# Patient Record
Sex: MALE | Race: BLACK OR AFRICAN AMERICAN | NOT HISPANIC OR LATINO | Employment: FULL TIME | URBAN - METROPOLITAN AREA
[De-identification: names, ages, dates, MRNs, and addresses within clinical notes are randomized per-mention and may not be internally consistent; named-entity substitution may affect disease eponyms.]

---

## 2017-01-10 ENCOUNTER — HOSPITAL ENCOUNTER (EMERGENCY)
Facility: OTHER | Age: 42
Discharge: HOME | End: 2017-01-10
Attending: EMERGENCY MEDICINE
Payer: COMMERCIAL

## 2017-01-10 ENCOUNTER — OFFICE VISIT (OUTPATIENT)
Dept: DERMATOLOGY | Facility: CLINIC | Age: 42
End: 2017-01-10
Payer: COMMERCIAL

## 2017-01-10 VITALS
BODY MASS INDEX: 25.77 KG/M2 | DIASTOLIC BLOOD PRESSURE: 92 MMHG | HEIGHT: 70 IN | HEART RATE: 83 BPM | TEMPERATURE: 98 F | OXYGEN SATURATION: 92 % | SYSTOLIC BLOOD PRESSURE: 149 MMHG | RESPIRATION RATE: 17 BRPM | WEIGHT: 180 LBS

## 2017-01-10 DIAGNOSIS — L73.9 FOLLICULITIS: ICD-10-CM

## 2017-01-10 DIAGNOSIS — R21 RASH: Primary | ICD-10-CM

## 2017-01-10 DIAGNOSIS — R21 RASH AND NONSPECIFIC SKIN ERUPTION: ICD-10-CM

## 2017-01-10 DIAGNOSIS — L81.9 DYSPIGMENTATION: Primary | ICD-10-CM

## 2017-01-10 PROCEDURE — 99999 PR PBB SHADOW E&M-EST. PATIENT-LVL II: CPT | Mod: PBBFAC,,, | Performed by: DERMATOLOGY

## 2017-01-10 PROCEDURE — 99283 EMERGENCY DEPT VISIT LOW MDM: CPT

## 2017-01-10 PROCEDURE — 1159F MED LIST DOCD IN RCRD: CPT | Mod: S$GLB,,, | Performed by: DERMATOLOGY

## 2017-01-10 PROCEDURE — 99203 OFFICE O/P NEW LOW 30 MIN: CPT | Mod: S$GLB,,, | Performed by: DERMATOLOGY

## 2017-01-10 PROCEDURE — 99282 EMERGENCY DEPT VISIT SF MDM: CPT

## 2017-01-10 RX ORDER — DOXYCYCLINE 100 MG/1
100 CAPSULE ORAL 2 TIMES DAILY
Qty: 28 CAPSULE | Refills: 0 | Status: SHIPPED | OUTPATIENT
Start: 2017-01-10 | End: 2017-01-24

## 2017-01-10 RX ORDER — PERMETHRIN 50 MG/G
CREAM TOPICAL ONCE
Qty: 60 G | Refills: 0 | Status: SHIPPED | OUTPATIENT
Start: 2017-01-10 | End: 2017-01-10

## 2017-01-10 RX ORDER — CLINDAMYCIN PHOSPHATE 10 MG/G
GEL TOPICAL 2 TIMES DAILY
Qty: 60 G | Refills: 1 | Status: SHIPPED | OUTPATIENT
Start: 2017-01-10 | End: 2018-01-10

## 2017-01-10 RX ORDER — HYDROQUINONE 40 MG/G
CREAM TOPICAL 2 TIMES DAILY
Qty: 46 G | Refills: 1 | Status: SHIPPED | OUTPATIENT
Start: 2017-01-10 | End: 2017-02-09

## 2017-01-10 NOTE — PROGRESS NOTES
Subjective:       Patient ID:  Meghna Viera is a 41 y.o. male who presents for   Chief Complaint   Patient presents with    Rash     back, x 1 wk, itchy at times, no tx     Rash  - Initial  Affected locations: back  Duration: 1 week (at times)  Signs / symptoms: itching  Aggravated by: nothing  Relieving factors/Treatments tried: nothing    He thinks the rash is due to bugs and he took pictures of the suspected culprits.  Pt was seen in ER and was sent to Derm clinic today  Denies personal or family h/o skin cancer    History reviewed. No pertinent past medical history.    Review of Systems   Constitutional: Positive for fatigue. Negative for fever, chills and malaise.   Skin: Positive for rash and activity-related sunscreen use. Negative for daily sunscreen use and recent sunburn.   Hematologic/Lymphatic: Does not bruise/bleed easily.        Objective:    Physical Exam   Constitutional: He appears well-developed and well-nourished. No distress.   Neurological: He is alert and oriented to person, place, and time. He is not disoriented.   Psychiatric: He has a normal mood and affect.   Skin:   Areas Examined (abnormalities noted in diagram):   Scalp / Hair Palpated and Inspected  Head / Face Inspection Performed  Neck Inspection Performed  Chest / Axilla Inspection Performed  Abdomen Inspection Performed  Back Inspection Performed  RUE Inspected  LUE Inspection Performed  Nails and Digits Inspection Performed                   Diagram Legend     Erythematous scaling macule/papule c/w actinic keratosis       Vascular papule c/w angioma      Pigmented verrucoid papule/plaque c/w seborrheic keratosis      Yellow umbilicated papule c/w sebaceous hyperplasia      Irregularly shaped tan macule c/w lentigo     1-2 mm smooth white papules consistent with Milia      Movable subcutaneous cyst with punctum c/w epidermal inclusion cyst      Subcutaneous movable cyst c/w pilar cyst      Firm pink to brown papule c/w  dermatofibroma      Pedunculated fleshy papule(s) c/w skin tag(s)      Evenly pigmented macule c/w junctional nevus     Mildly variegated pigmented, slightly irregular-bordered macule c/w mildly atypical nevus      Flesh colored to evenly pigmented papule c/w intradermal nevus       Pink pearly papule/plaque c/w basal cell carcinoma      Erythematous hyperkeratotic cursted plaque c/w SCC      Surgical scar with no sign of skin cancer recurrence      Open and closed comedones      Inflammatory papules and pustules      Verrucoid papule consistent consistent with wart     Erythematous eczematous patches and plaques     Dystrophic onycholytic nail with subungual debris c/w onychomycosis     Umbilicated papule    Erythematous-base heme-crusted tan verrucoid plaque consistent with inflamed seborrheic keratosis     Erythematous Silvery Scaling Plaque c/w Psoriasis     See annotation      Assessment / Plan:        Dyspigmentation  -     hydroquinone 4 % Crea; Apply topically 2 (two) times daily.  Dispense: 46 g; Refill: 1    Folliculitis  -     clindamycin phosphate 1% (CLINDAGEL) 1 % gel; Apply topically 2 (two) times daily. face  Dispense: 60 g; Refill: 1  -     doxycycline (MONODOX) 100 MG capsule; Take 1 capsule (100 mg total) by mouth 2 (two) times daily.  Dispense: 28 capsule; Refill: 0    Rash and nonspecific skin eruption-other than folliculitis, no active rash/lesions on exam today  Mineral oil prep negative  Will prescribe elimite empirically   -     permethrin (ELIMITE) 5 % cream; Apply topically once. Apply from neck to toes, leave on overnight, shower in the morning.  Repeat in 1 weeks  Dispense: 60 g; Refill: 0           Return in about 4 weeks (around 2/7/2017).

## 2017-01-10 NOTE — ED PROVIDER NOTES
Encounter Date: 1/10/2017       History     Chief Complaint   Patient presents with    Rash     pt states he has a bite of some kind to left side of neck and face and back. states he thinks it was spider and parasites. pt seen in [place] ED for same thing.      Review of patient's allergies indicates:   Allergen Reactions    Pcn [penicillins]      HPI Comments: Pt is a 41-year-old male with no significant medical history who presents to the emergency department with rash.  Patient states over the last several weeks he has noticed multiple bumps to his body.  Patient states he feels as if he has bugs crawling on him.  Patient states he is pulling black specks off of his skin, and he is certain that these are parasites.  Patient states he is seen many emergency department physicians who have all pushed him to the side.  Patient states he traveled from [place] today to see if we could help him.  Patient denies any fevers or chills.  Patient denies any recent travel.  Patient denies any recent camping.  Patient denies any recent change in medications, lotions, soaps, detergents, or other body products.  Patient states he knows that everyone thinks he is crazy, but he is certain that he has bugs.    The history is provided by the patient.     History reviewed. No pertinent past medical history.  No past medical history pertinent negatives.  Past Surgical History   Procedure Laterality Date    Toe surgery  2007     History reviewed. No pertinent family history.  Social History   Substance Use Topics    Smoking status: Never Smoker    Smokeless tobacco: None    Alcohol use No     Review of Systems   Constitutional: Negative for activity change, appetite change, chills, fatigue and fever.   HENT: Negative for congestion, ear discharge, ear pain, hearing loss, mouth sores, postnasal drip, rhinorrhea, sinus pressure, sore throat and trouble swallowing.    Eyes: Negative for photophobia and visual disturbance.    Respiratory: Negative for cough and shortness of breath.    Cardiovascular: Negative for chest pain.   Gastrointestinal: Negative for abdominal pain, blood in stool, constipation, diarrhea, nausea and vomiting.   Genitourinary: Negative for dysuria, flank pain and hematuria.   Musculoskeletal: Negative for back pain, neck pain and neck stiffness.   Skin: Positive for rash. Negative for wound.   Neurological: Negative for dizziness, syncope, speech difficulty, weakness, light-headedness, numbness and headaches.       Physical Exam   Initial Vitals   BP Pulse Resp Temp SpO2   01/10/17 1101 01/10/17 1101 01/10/17 1101 01/10/17 1101 01/10/17 1101   136/94 92 17 98 °F (36.7 °C) 99 %     Physical Exam    Nursing note and vitals reviewed.  Constitutional: He appears well-developed and well-nourished. He is not diaphoretic.  Non-toxic appearance. No distress.   HENT:   Head: Normocephalic.   Right Ear: Hearing and external ear normal.   Left Ear: Hearing and external ear normal.   Nose: Nose normal.   Mouth/Throat: Uvula is midline and oropharynx is clear and moist. No trismus in the jaw. No uvula swelling. No oropharyngeal exudate.   Eyes: Conjunctivae are normal. Pupils are equal, round, and reactive to light.   Neck: Normal range of motion.   Cardiovascular: Normal rate and regular rhythm.   Pulmonary/Chest: Breath sounds normal. No respiratory distress. He has no wheezes. He has no rhonchi. He has no rales. He exhibits no tenderness.   Abdominal: Soft. Bowel sounds are normal. He exhibits no distension and no mass. There is no tenderness. There is no rebound and no guarding.   Lymphadenopathy:     He has no cervical adenopathy.   Neurological: He is alert and oriented to person, place, and time.   Skin: Skin is warm and dry.   Diffuse erythematous, scabbed lesions to trunk and right extremity   Psychiatric: He has a normal mood and affect.         ED Course   Procedures  Labs Reviewed - No data to display           Medical Decision Making:   Initial Assessment:   Urgent evaluation of a 41-year-old male with no significant medical history who presents to the emergency department with rash.  Patient is afebrile, nontoxic appearing, and hemodynamically stable.  Patient has diffuse erythematous scabbed lesions to trunk.  The rash appears to be folliculitis or scabs caused from excoriation.  Patient is very adamant and wants further testing because he believes he has parasites/bugs crawling in his skin.  She reports to the waiting room and comes back to show me a bugs that he removed from his body while sitting in the waiting room.  He presented a minute black spec that I am unable to determine what it is.  Pt is very upset and has traveled far to come to our emergency department.  I do not have the proper equipment to skin scrape or perform microscopy.  Derm is consulted.  I have made an appointment for him today with Dr. Guadalupe at 2:15.  Patient is given instructions to report there immediately after being discharged.  Other:   I have discussed this case with another health care provider.       <> Summary of the Discussion: Dr. Nolan                   ED Course     Clinical Impression:   The encounter diagnosis was Rash.          Erika Carlos PA-C  01/10/17 0785

## 2017-01-10 NOTE — DISCHARGE INSTRUCTIONS
Self-Care for Skin Rashes  When your skin reacts to a substance your body is sensitive to, it can cause a rash. You can treat most rashes at home by keeping the skin clean and dry. Many rashes may get better on their own within 2 to 3 days. You may need medical attention if your rash itches, drains, or hurts, particularly if the rash is getting worse.  What can cause a skin rash?  · Sun poisoning, caused by too much exposure to the sun  · An irritant or allergic reaction to a certain type of food, plant, or chemical, such as  shellfish, poison ivy, and or cleaning products  · An infection caused by a fungus (ringworm), virus (chickenpox), or bacteria (strep)  · Bites or infestation caused by insects or pests, such as ticks, lice, or mites  · Dry skin, which is often seen during the winter months and in older people  How can I control itching and skin damage?  · Take soothing  lukewarm baths in a colloidal oatmeal product. You can buy this at the [employer].  · Do your best not to scratch. Clip fingernails short, especially in young children, to reduce skin damage if scratching does occur.  · Use moisturizing skin lotion instead of scratching your dry skin.  · Use sunscreen whenever going out into direct sun.  · Use only mild cleansing agents whenever possible.  · Wash with mild, nonirritating soap and warm water.  · Wear clothing that breathes, such as cotton shirts or canvas shoes.  · If fluid is seeping from the rash, cover it loosely with clean gauze to absorb the discharge.  · Many rashes are contagious. Prevent the rash from spreading to others by washing your hands often before or after touching others with any skin rash.  Use medicine  · Antihistamines such as diphenhydramine can help control itching. But use with caution because they can make you drowsy.  · Using over-the-counter hydrocortisone cream on small rashes may help reduce swelling and itching  · Most over-the-counter antifungal medicines can treat  athletes foot and many other fungal infections of the skin.  Check with your healthcare provider  Call your healthcare provider if:  · You were told that you have a fungal infection on your skin to make sure you have the correct type of medicine  · You have questions or concerns about medicines or their side effects.      Call 911  Call 911 if either of these occur:  · Your tongue or lips start to swell  · You have difficulty breathing      Call your healthcare provider  Call your healthcare provider if any of these occur:  · Temperature  of more than 101.0°F (38.3°C), or as directed  · Sore throat, a cough, or unusual fatigue  · Red, oozy, or painful rash gets worse. These are signs of infection.  · Rash covers your face, genitals, or most of your body  · Crusty sores or red rings that begin to spread  · You were exposed to someone who has a contagious rash, such as scabies or lice.  · Red bulls-eye rash with a white center (a sign of Lyme disease)  · You were told that you have resistant bacteria (MRSA) on your skin.   © 1217-8097 [employer]. [address] [place] [address], [place] [address]. All rights reserved. This information is not intended as a substitute for professional medical care. Always follow your healthcare professional's instructions.

## 2017-01-10 NOTE — ED AVS SNAPSHOT
"          [facility]  [address]  [facility] [address]               Meghna Viera   1/10/2017 12:18 PM   ED    Description:  Male : 1975   Department:  [facility]           Your Care was Coordinated By:     Provider Role From To    Ashley Nolan MD Attending Provider 01/10/17 1225 --    Erika Carlos PA-C Physician Assistant 01/10/17 1223 --      Reason for Visit     Rash           Diagnoses this Visit        Comments    Rash    -  Primary       ED Disposition     None           To Do List           Follow-up Information     Follow up with Citlaly Guadalupe MD.    Specialty:  Dermatology    Why:  On the 11th floor; take elevator C    Contact information:    [address]  [facility] [address]  [phone]        [facility]     [facility] Care [facility] -  Assistance  Registered nurses in the [facility] provide clinical advisement, health education, appointment booking, and other advisory services.  Call for this free service at [phone].             Medications           Message regarding Medications     Verify the changes and/or additions to your medication regime listed below are the same as discussed with your clinician today.  If any of these changes or additions are incorrect, please notify your healthcare provider.             Verify that the below list of medications is an accurate representation of the medications you are currently taking.  If none reported, the list may be blank. If incorrect, please contact your healthcare provider. Carry this list with you in case of emergency.                Clinical Reference Information           Your Vitals Were     BP Pulse Temp Resp Height Weight    136/94 (BP Location: Left arm, Patient Position: Sitting) 92 98 °F (36.7 °C) (Oral) 17 5' 10" (1.778 m) 81.6 kg (180 lb)    SpO2 BMI             99% 25.83 kg/m2         Allergies as of 1/10/2017        " Reactions    Pcn [Penicillins]       Immunizations Administered on Date of Encounter - 1/10/2017     None      ED Micro, Lab, POCT     None      ED Imaging Orders     None        Discharge Instructions         Self-Care for Skin Rashes  When your skin reacts to a substance your body is sensitive to, it can cause a rash. You can treat most rashes at home by keeping the skin clean and dry. Many rashes may get better on their own within 2 to 3 days. You may need medical attention if your rash itches, drains, or hurts, particularly if the rash is getting worse.  What can cause a skin rash?  · Sun poisoning, caused by too much exposure to the sun  · An irritant or allergic reaction to a certain type of food, plant, or chemical, such as  shellfish, poison ivy, and or cleaning products  · An infection caused by a fungus (ringworm), virus (chickenpox), or bacteria (strep)  · Bites or infestation caused by insects or pests, such as ticks, lice, or mites  · Dry skin, which is often seen during the winter months and in older people  How can I control itching and skin damage?  · Take soothing  lukewarm baths in a colloidal oatmeal product. You can buy this at the [employer].  · Do your best not to scratch. Clip fingernails short, especially in young children, to reduce skin damage if scratching does occur.  · Use moisturizing skin lotion instead of scratching your dry skin.  · Use sunscreen whenever going out into direct sun.  · Use only mild cleansing agents whenever possible.  · Wash with mild, nonirritating soap and warm water.  · Wear clothing that breathes, such as cotton shirts or canvas shoes.  · If fluid is seeping from the rash, cover it loosely with clean gauze to absorb the discharge.  · Many rashes are contagious. Prevent the rash from spreading to others by washing your hands often before or after touching others with any skin rash.  Use medicine  · Antihistamines such as diphenhydramine can help control itching.  But use with caution because they can make you drowsy.  · Using over-the-counter hydrocortisone cream on small rashes may help reduce swelling and itching  · Most over-the-counter antifungal medicines can treat athletes foot and many other fungal infections of the skin.  Check with your healthcare provider  Call your healthcare provider if:  · You were told that you have a fungal infection on your skin to make sure you have the correct type of medicine  · You have questions or concerns about medicines or their side effects.      Call 911  Call 911 if either of these occur:  · Your tongue or lips start to swell  · You have difficulty breathing      Call your healthcare provider  Call your healthcare provider if any of these occur:  · Temperature  of more than 101.0°F (38.3°C), or as directed  · Sore throat, a cough, or unusual fatigue  · Red, oozy, or painful rash gets worse. These are signs of infection.  · Rash covers your face, genitals, or most of your body  · Crusty sores or red rings that begin to spread  · You were exposed to someone who has a contagious rash, such as scabies or lice.  · Red bulls-eye rash with a white center (a sign of Lyme disease)  · You were told that you have resistant bacteria (MRSA) on your skin.   © 5803-1571 [employer]. [address] [place] [address], [place] [address]. All rights reserved. This information is not intended as a substitute for professional medical care. Always follow your healthcare professional's instructions.          Your Scheduled Appointments     Thiago 10, 2017  2:15 PM CST   New Patient with MD Cristóbal Mi - Dermatology ([address] )    [address]  [facility] [address]   [phone]              [facility] Sign-Up     Activating your [facility] account is as easy as 1-2-3!     1) Visit [email or web address], select Sign Up Now, enter this activation code and your date of birth, then select Next.  [email or web address]  Expires:  2/24/2017 12:53 PM      2) Create a username and password to use when you visit [facility] in the future and select a security question in case you lose your password and select Next.    3) Enter your e-mail address and click Sign Up!    Additional Information  If you have questions, please e-mail [email or web address] or call [phone] to talk to our [facility] staff. Remember, [facility] is NOT to be used for urgent needs. For medical emergencies, dial 911.          [facility] complies with applicable Federal civil rights laws and does not discriminate on the basis of race, color, national origin, age, disability, or sex.        Language Assistance Services     ATTENTION: Language assistance services are available, free of charge. Please call [phone].      ATENCIÓN: Si habla español, tiene a galvan disposición servicios gratuitos de asistencia lingüística. Llame al [phone].     SHARYN Ý: N?u b?n nói Ti?ng Vi?t, có các d?ch v? h? tr? ngôn ng? mi?n phí dành cho b?n. G?i s? [phone].

## 2017-01-10 NOTE — LETTER
January 15, 2017      Erika Carlos PA-C  [address]  [facility] [address]           [facility] - Dermatology  [address]  [facility] [address]  Phone: [phone]  Fax: [phone]          Patient: Meghna Viera   MR Number: [id number]   YOB: 1975   Date of Visit: 1/10/2017       Dear Erika Carlos:    Thank you for referring Meghna Viera to me for evaluation. Attached you will find relevant portions of my assessment and plan of care.    If you have questions, please do not hesitate to call me. I look forward to following Meghna Viera along with you.    Sincerely,    Citlaly Guadalupe MD    Enclosure  CC:  No Recipients    If you would like to receive this communication electronically, please contact [email or web address] or [phone] to request more information on [employer] access.    For providers and/or their staff who would like to refer a patient to [facility], please contact us through our one-stop-shop provider referral line, [facility], at [phone].    If you feel you have received this communication in error or would no longer like to receive these types of communications, please e-mail [email or web address]

## 2017-01-10 NOTE — ED TRIAGE NOTES
Patient c/o a generalized rash for 4-5 days that itch.  Patient thinks he was bit by some bugs.  Patient was seen at another ED and was given an antibiotic that he cant recall the name of.

## 2017-05-26 ENCOUNTER — TELEPHONE (OUTPATIENT)
Dept: DERMATOLOGY | Facility: CLINIC | Age: 42
End: 2017-05-26

## 2017-05-26 RX ORDER — PERMETHRIN 50 MG/G
CREAM TOPICAL ONCE
Qty: 60 G | Refills: 0 | OUTPATIENT
Start: 2017-05-26 | End: 2017-05-26

## 2017-05-26 NOTE — TELEPHONE ENCOUNTER
----- Message from Lsia Floyd sent at 5/25/2017  4:08 PM CDT -----  Call patient about a refill request. Call [phone]

## 2017-12-30 PROCEDURE — 99283 EMERGENCY DEPT VISIT LOW MDM: CPT | Mod: 25

## 2017-12-30 PROCEDURE — 99284 EMERGENCY DEPT VISIT MOD MDM: CPT | Mod: ,,, | Performed by: PHYSICIAN ASSISTANT

## 2017-12-31 ENCOUNTER — HOSPITAL ENCOUNTER (EMERGENCY)
Facility: HOSPITAL | Age: 42
Discharge: HOME | End: 2017-12-31
Attending: EMERGENCY MEDICINE
Payer: COMMERCIAL

## 2017-12-31 VITALS
WEIGHT: 190 LBS | TEMPERATURE: 99 F | DIASTOLIC BLOOD PRESSURE: 97 MMHG | HEIGHT: 70 IN | SYSTOLIC BLOOD PRESSURE: 134 MMHG | OXYGEN SATURATION: 98 % | RESPIRATION RATE: 18 BRPM | BODY MASS INDEX: 27.2 KG/M2 | HEART RATE: 89 BPM

## 2017-12-31 DIAGNOSIS — R21 RASH: Primary | ICD-10-CM

## 2017-12-31 PROCEDURE — 96372 THER/PROPH/DIAG INJ SC/IM: CPT

## 2017-12-31 PROCEDURE — 63600175 PHARM REV CODE 636 W HCPCS: Performed by: PHYSICIAN ASSISTANT

## 2017-12-31 RX ORDER — TRAMADOL HYDROCHLORIDE 50 MG/1
50 TABLET ORAL EVERY 8 HOURS PRN
Qty: 8 TABLET | Refills: 0 | Status: SHIPPED | OUTPATIENT
Start: 2017-12-31 | End: 2018-01-05

## 2017-12-31 RX ORDER — KETOROLAC TROMETHAMINE 30 MG/ML
10 INJECTION, SOLUTION INTRAMUSCULAR; INTRAVENOUS
Status: COMPLETED | OUTPATIENT
Start: 2017-12-31 | End: 2017-12-31

## 2017-12-31 RX ORDER — CEPHALEXIN 250 MG/1
250 CAPSULE ORAL 4 TIMES DAILY
Qty: 28 CAPSULE | Refills: 0 | Status: SHIPPED | OUTPATIENT
Start: 2017-12-31 | End: 2018-01-07

## 2017-12-31 RX ORDER — PREDNISONE 20 MG/1
20 TABLET ORAL DAILY
Qty: 5 TABLET | Refills: 0 | Status: SHIPPED | OUTPATIENT
Start: 2017-12-31 | End: 2018-01-05

## 2017-12-31 RX ORDER — PREDNISONE 20 MG/1
40 TABLET ORAL
Status: COMPLETED | OUTPATIENT
Start: 2017-12-31 | End: 2017-12-31

## 2017-12-31 RX ORDER — PREDNISONE 20 MG/1
20 TABLET ORAL DAILY
Qty: 5 TABLET | Refills: 0 | Status: SHIPPED | OUTPATIENT
Start: 2017-12-31 | End: 2017-12-31

## 2017-12-31 RX ADMIN — PREDNISONE 40 MG: 20 TABLET ORAL at 01:12

## 2017-12-31 RX ADMIN — KETOROLAC TROMETHAMINE 10 MG: 30 INJECTION, SOLUTION INTRAMUSCULAR at 01:12

## 2017-12-31 NOTE — ED TRIAGE NOTES
Patient presents to the ED c/o fever, body aches and skin irritation x 1 week. Patient took Tylenol at approx.2100. Patient denies any nausea, vomting or diarrhea.

## 2017-12-31 NOTE — ED PROVIDER NOTES
Encounter Date: 12/30/2017       History     Chief Complaint   Patient presents with    Generalized Body Aches     pt reports body aches and states that he noticed dark color over buttocks, pt reports fever at home, afebrile in traige      Patient is a 42-year-old male with no significant past medical history who presents to the emergency department due to a 1 week history of rash.  Patient states he noted a rash on the back side of his legs extending from his buttocks to his ankle.  Patient states the rash is very sensitive to the touch and painful.  Patient does also report intermittent fevers throughout the week of 102-103.  Patient states he has been taking Tylenol and ibuprofen with some relief of the fever.  Patient states that he tried to use Vaseline on these lesions with no improvement. He has had some cough and congestion this week. Patient denies any nausea, vomiting, chills, chest pain, shortness breath, or any other complaints.          Review of patient's allergies indicates:   Allergen Reactions    Pcn [penicillins]      History reviewed. No pertinent past medical history.  Past Surgical History:   Procedure Laterality Date    TOE SURGERY  2007     History reviewed. No pertinent family history.  Social History   Substance Use Topics    Smoking status: Never Smoker    Smokeless tobacco: Never Used    Alcohol use No     Review of Systems   Constitutional: Positive for fever. Negative for activity change, appetite change, diaphoresis and fatigue.   HENT: Negative for congestion, dental problem, drooling, ear pain, facial swelling, sore throat and trouble swallowing.    Eyes: Negative for pain, discharge and visual disturbance.   Respiratory: Negative for apnea, cough, chest tightness and shortness of breath.    Cardiovascular: Negative for chest pain and palpitations.   Gastrointestinal: Negative for abdominal distention, anal bleeding, blood in stool, diarrhea, nausea and vomiting.   Endocrine:  Negative for cold intolerance and polydipsia.   Genitourinary: Negative for decreased urine volume, difficulty urinating, enuresis, frequency and hematuria.   Musculoskeletal: Negative for arthralgias, gait problem, myalgias and neck stiffness.   Skin: Negative for color change and pallor.   Allergic/Immunologic: Negative for environmental allergies.   Neurological: Negative for dizziness, syncope, numbness and headaches.   Psychiatric/Behavioral: Negative for agitation, confusion and dysphoric mood.       Physical Exam     Initial Vitals [12/30/17 2306]   BP Pulse Resp Temp SpO2   (!) 175/97 91 20 98.7 °F (37.1 °C) 100 %      MAP       123         Physical Exam    Nursing note and vitals reviewed.  Constitutional: He appears well-developed and well-nourished. He is not diaphoretic. No distress.   HENT:   Head: Normocephalic and atraumatic.   Neck: Normal range of motion. Neck supple.   Cardiovascular: Normal rate, regular rhythm and normal heart sounds. Exam reveals no gallop and no friction rub.    No murmur heard.  Pulmonary/Chest: Breath sounds normal. He has no wheezes. He has no rhonchi. He has no rales.   Abdominal: Soft. Bowel sounds are normal. There is no tenderness. There is no rebound and no guarding.   Musculoskeletal: Normal range of motion.   Neurological: He is alert and oriented to person, place, and time.   Skin: Skin is warm and dry. Rash noted. No erythema.   Psychiatric: He has a normal mood and affect.         ED Course   Procedures  Labs Reviewed - No data to display                APC / Resident Notes:   Patient is a 42-year-old male with no significant past medical history who presents to the emergency department due to a 1 week history of rash.  His exam reveals male in no acute distress.  Heart regular rate and rhythm.  Lungs clear to auscultation bilaterally.  Scaly rash on patient's posterior aspect of legs from buttocks to ankles.  Patient will be given a dose of steroids in house and  discharged on a 5 day regimen of steroids.  Patient will also be given Ultram for pain control.  Patient will be given a prescription for Keflex and is told to take medication if rash does not resolve with steroids.  Patient encouraged to follow up with dermatology.  It was explained to patient that dermatology is the expert on skin issues and is very important that he see the specialty for further workup.  Plan of treatment discussed with attending physician and he is agreeable.         Attending Attestation:     Physician Attestation Statement for NP/PA:   I have conducted a face to face encounter with this patient in addition to the NP/PA, due to Medical Complexity    Other NP/PA Attestation Additions:      Medical Decision MakinF with no PMHx presents with diffuse rash over buttocks and legs>arms x 1 week, with reported fevers. It is itchy and painful; he saw Derm last year for reported folliculitis, which per pt is different than this. Rash appears to dry skin, no erythema or sign of cellulitis, no vesicles or other clear etiology. Possible eczema since he has h/o asthma, but he has unusual diffuse distribution for new onset. Will treat empirically with steroids, and also Rx Keflex in case of any worsening to cover less likely infectious cause, though fevers are more likely due to URI sx, and afebrile in ED. He will f/u Derm within 3 days, return for any worsening                 ED Course      Clinical Impression:   The encounter diagnosis was Rash.                           Ellie Hansen PA-C  [id number]       Hal yRan MD  [phone]